# Patient Record
Sex: FEMALE | Race: WHITE | NOT HISPANIC OR LATINO | Employment: UNEMPLOYED | URBAN - METROPOLITAN AREA
[De-identification: names, ages, dates, MRNs, and addresses within clinical notes are randomized per-mention and may not be internally consistent; named-entity substitution may affect disease eponyms.]

---

## 2023-06-30 ENCOUNTER — HOSPITAL ENCOUNTER (OUTPATIENT)
Dept: DATA CONVERSION | Facility: HOSPITAL | Age: 58
End: 2023-06-30
Attending: ORTHOPAEDIC SURGERY | Admitting: ORTHOPAEDIC SURGERY

## 2023-06-30 DIAGNOSIS — Z79.891 LONG TERM (CURRENT) USE OF OPIATE ANALGESIC: ICD-10-CM

## 2023-06-30 DIAGNOSIS — F17.200 NICOTINE DEPENDENCE, UNSPECIFIED, UNCOMPLICATED: ICD-10-CM

## 2023-06-30 DIAGNOSIS — S53.125A POSTERIOR DISLOCATION OF LEFT ULNOHUMERAL JOINT, INITIAL ENCOUNTER: ICD-10-CM

## 2023-06-30 DIAGNOSIS — S52.532A COLLES' FRACTURE OF LEFT RADIUS, INITIAL ENCOUNTER FOR CLOSED FRACTURE: ICD-10-CM

## 2023-06-30 DIAGNOSIS — Z79.1 LONG TERM (CURRENT) USE OF NON-STEROIDAL ANTI-INFLAMMATORIES (NSAID): ICD-10-CM

## 2023-06-30 LAB
ANION GAP IN SER/PLAS: 14 MMOL/L (ref 10–20)
CALCIUM (MG/DL) IN SER/PLAS: 9.3 MG/DL (ref 8.6–10.3)
CARBON DIOXIDE, TOTAL (MMOL/L) IN SER/PLAS: 28 MMOL/L (ref 21–32)
CHLORIDE (MMOL/L) IN SER/PLAS: 94 MMOL/L (ref 98–107)
CREATININE (MG/DL) IN SER/PLAS: 0.61 MG/DL (ref 0.5–1.05)
GFR FEMALE: >90 ML/MIN/1.73M2
GLUCOSE (MG/DL) IN SER/PLAS: 386 MG/DL (ref 74–99)
POCT GLUCOSE: 304 MG/DL (ref 74–99)
POCT GLUCOSE: 373 MG/DL (ref 74–99)
POTASSIUM (MMOL/L) IN SER/PLAS: 5.1 MMOL/L (ref 3.5–5.3)
SODIUM (MMOL/L) IN SER/PLAS: 131 MMOL/L (ref 136–145)
UREA NITROGEN (MG/DL) IN SER/PLAS: 8 MG/DL (ref 6–23)

## 2023-09-30 NOTE — H&P
History & Physical Reviewed:   Pregnant/Lactating:  ·  Are You Pregnant no   ·  Are You Currently Breastfeeding no     I have reviewed the History and Physical dated:  29-Jun-2023   History and Physical reviewed and relevant findings noted. Patient examined to review pertinent physical  findings.: No significant changes   Home Medications Reviewed: no changes noted   Allergies Reviewed: no changes noted       ERAS (Enhanced Recovery After Surgery):  ·  ERAS Patient: no     Consent:   COVID-19 Consent:  ·  COVID-19 Risk Consent Surgeon has reviewed key risks related to the risk of rivera COVID-19 and if they contract COVID-19 what the risks are.       Electronic Signatures:  Neil Cleaning)  (Signed 30-Jun-2023 04:14)   Authored: History & Physical Reviewed, ERAS, Consent,  Note Completion      Last Updated: 30-Jun-2023 04:14 by Neil Cleaning)

## 2023-10-02 NOTE — OP NOTE
PROCEDURE DETAILS    Preoperative Diagnosis:  Closed traumatic displaced Colles' fracture of left radius, S52.532A  Posterior dislocation of ulnohumeral joint, left, initial encounter, S53.125A  null, null    Postoperative Diagnosis:  Closed traumatic displaced Colles' fracture of left radius, S52.532A  Posterior dislocation of ulnohumeral joint, left, initial encounter, S53.125A  null, null    Surgeon: Neil Cleaning  Resident/Fellow/Other Assistant: Rory Roman    Procedure:  1. OPEN REDUCTION INTERNAL FIXATION LEFT DISTAL RADIUS FRACTURE WITH C-ARM    2. LEFT ELBOW CLOSED REDUCTION     Anesthesia: No anesthesiologist associated with this case  Estimated Blood Loss: 10mL  Blood Replaced: none  Findings: intra-articular distal radius with more than 3 fragments  Specimens(s) Collected: no,     Complications: none  Implants: Synthes distal radius plate  Tourniquet Times: 33 minutes at 250mmHg on left arm  Patient Returned To/Condition: stable    Date of Dictation: 30-Jun-2023  Dictated By: Neil Cleaning MD  Dictation Job Number: [id number]                            Attestation:   Note Completion:  Attending Attestation I performed the procedure without a resident         Electronic Signatures:  Neil Cleaning)  (Signed 30-Jun-2023 08:49)   Authored: Post-Operative Note, Chart Review, Note Completion      Last Updated: 30-Jun-2023 08:49 by Neil Cleaning)

## 2023-11-03 ENCOUNTER — HOSPITAL ENCOUNTER (EMERGENCY)
Facility: HOSPITAL | Age: 58
Discharge: HOME | End: 2023-11-03

## 2023-11-03 VITALS
DIASTOLIC BLOOD PRESSURE: 66 MMHG | WEIGHT: 165 LBS | HEIGHT: 65 IN | HEART RATE: 86 BPM | OXYGEN SATURATION: 96 % | SYSTOLIC BLOOD PRESSURE: 128 MMHG | RESPIRATION RATE: 19 BRPM | TEMPERATURE: 98.2 F | BODY MASS INDEX: 27.49 KG/M2

## 2023-11-03 DIAGNOSIS — F10.920 ALCOHOLIC INTOXICATION WITHOUT COMPLICATION (CMS-HCC): Primary | ICD-10-CM

## 2023-11-03 DIAGNOSIS — F43.21 ADJUSTMENT DISORDER WITH DEPRESSED MOOD: ICD-10-CM

## 2023-11-03 PROBLEM — M25.632 STIFFNESS OF LEFT WRIST JOINT: Status: ACTIVE | Noted: 2023-08-07

## 2023-11-03 PROBLEM — I10 PRIMARY HYPERTENSION: Status: ACTIVE | Noted: 2023-07-13

## 2023-11-03 PROBLEM — E66.9 OBESITY, CLASS I, BMI 30-34.9: Status: ACTIVE | Noted: 2023-07-13

## 2023-11-03 PROBLEM — S52.509S: Status: ACTIVE | Noted: 2023-08-07

## 2023-11-03 PROBLEM — K21.9 GERD WITHOUT ESOPHAGITIS: Status: ACTIVE | Noted: 2020-11-25

## 2023-11-03 PROBLEM — M87.051 AVASCULAR NECROSIS OF BONE OF RIGHT HIP (MULTI): Status: ACTIVE | Noted: 2023-11-03

## 2023-11-03 PROBLEM — F17.200 TOBACCO USE DISORDER: Status: ACTIVE | Noted: 2023-07-13

## 2023-11-03 PROBLEM — E11.9 NEW ONSET TYPE 2 DIABETES MELLITUS (MULTI): Status: ACTIVE | Noted: 2023-07-13

## 2023-11-03 PROBLEM — M16.11 OSTEOARTHRITIS OF RIGHT HIP: Status: ACTIVE | Noted: 2023-11-03

## 2023-11-03 PROCEDURE — 99283 EMERGENCY DEPT VISIT LOW MDM: CPT

## 2023-11-03 SDOH — HEALTH STABILITY: MENTAL HEALTH

## 2023-11-03 SDOH — HEALTH STABILITY: MENTAL HEALTH: SUICIDE ASSESSMENT: ADULT (C-SSRS)

## 2023-11-03 SDOH — HEALTH STABILITY: MENTAL HEALTH: BEHAVIORS/MOOD: APPEARS INTOXICATED

## 2023-11-03 ASSESSMENT — PAIN SCALES - GENERAL
PAINLEVEL_OUTOF10: 0 - NO PAIN
PAINLEVEL_OUTOF10: 0 - NO PAIN

## 2023-11-03 ASSESSMENT — LIFESTYLE VARIABLES
EVER HAD A DRINK FIRST THING IN THE MORNING TO STEADY YOUR NERVES TO GET RID OF A HANGOVER: NO
REASON UNABLE TO ASSESS: NO
HAVE YOU EVER FELT YOU SHOULD CUT DOWN ON YOUR DRINKING: NO
HAVE PEOPLE ANNOYED YOU BY CRITICIZING YOUR DRINKING: NO
EVER FELT BAD OR GUILTY ABOUT YOUR DRINKING: NO

## 2023-11-03 ASSESSMENT — PAIN - FUNCTIONAL ASSESSMENT
PAIN_FUNCTIONAL_ASSESSMENT: 0-10
PAIN_FUNCTIONAL_ASSESSMENT: 0-10

## 2023-11-03 ASSESSMENT — PAIN DESCRIPTION - PROGRESSION: CLINICAL_PROGRESSION: NOT CHANGED

## 2023-11-03 NOTE — ED NOTES
Patient came to desk while I was giving report on another patient and would not leave the desk. I asked her several times to wait in her room and we would get her a provider as soon as possible. Patient began to get loud and angry with staff. Security was called back to assist patient back in to room, but once security was paged. Patient apologized and went back to her room.          Yu Jones RN  11/03/23 1953

## 2023-11-03 NOTE — ED TRIAGE NOTES
BIBA for roommate concern for suicidal ideations. Patient denies active or passive suicidal ideations, she does state that she drank today because she's been stressed about losing her job the end of last month. She states she just bought a house, trying to find a job in the holidays is difficult, and she was just having a bad day. States the last time she had even had an alcoholic beverage in almost 2 months.     Patient states she has no history of attempts either. Denies any other complaints at this time.

## 2023-11-04 NOTE — ED NOTES
Pt ambulated 2 laps of purple pod, with no assistance. Patient was steady on her feet, cooperative, and appropriate. Provider made aware.     Pt to be discharged into family/friend care.     Yu Jones RN  11/03/23 2038

## 2023-11-04 NOTE — ED PROVIDER NOTES
HPI   Chief Complaint   Patient presents with    Depression       Patient is a 38-year-old female who presents ED today for a psychiatric evaluation.  Patient has been drinking throughout the course of the day and recently lost her job and has been feeling depressed.  Patient allegedly made statements about thoughts of suicide and a friend called EMS.  EMS states the patient has denied any suicidal ideations, homicidal ideations. Patient currently denies any suicidal ideations or homicidal ideations.  Patient denies any audio or visual hallucinations.    Patient has history of: Anxiety, takes Klonopin as needed for this.  Patient states that there was a financial issue that seem to start this episode.  Patient endorses EtOH/tobacco, denies drug use.  Patient does not have a counselor/psychiatrist/psychologist that they see for their mental health care.                            [place] Coma Scale Score: 15                  Patient History   History reviewed. No pertinent past medical history.  Past Surgical History:   Procedure Laterality Date    OTHER SURGICAL HISTORY  10/15/2020    History of prior surgery     No family history on file.  Social History     Tobacco Use    Smoking status: Not on file    Smokeless tobacco: Not on file   Substance Use Topics    Alcohol use: Not on file    Drug use: Not on file       Physical Exam   ED Triage Vitals [11/03/23 1927]   Temp Heart Rate Resp BP   36.8 °C (98.2 °F) 95 19 119/85      SpO2 Temp Source Heart Rate Source Patient Position   95 % Temporal Monitor --      BP Location FiO2 (%)     Left arm --       Physical Exam  Vitals and nursing note reviewed.   Constitutional:       General: She is not in acute distress.     Appearance: She is well-developed.   HENT:      Head: Normocephalic and atraumatic.   Eyes:      Conjunctiva/sclera: Conjunctivae normal.   Cardiovascular:      Rate and Rhythm: Normal rate and regular rhythm.      Heart sounds: No murmur  heard.  Pulmonary:      Effort: Pulmonary effort is normal. No respiratory distress.      Breath sounds: Normal breath sounds.   Abdominal:      Palpations: Abdomen is soft.      Tenderness: There is no abdominal tenderness.   Musculoskeletal:         General: No swelling.      Cervical back: Neck supple.   Skin:     General: Skin is warm and dry.      Capillary Refill: Capillary refill takes less than 2 seconds.   Neurological:      Mental Status: She is alert and oriented to person, place, and time.      GCS: GCS eye subscore is 4. GCS verbal subscore is 5. GCS motor subscore is 6.      Gait: Gait abnormal (Walked patient to the bathroom as she was unsteady on her feet due to intoxication.).   Psychiatric:         Mood and Affect: Mood normal.         ED Course & MDM   ED Course as of 11/03/23 2155 Fri Nov 03, 2023 2030 Patient was ambulated throughout the department by nurse with no evidence of instability.  Patient was advised that she is able to be discharged if she can find a  to take her. [WS]   2050 Patient's ride is here and patient was able to ambulate out of the department without assistance. [WS]      ED Course User Index  [WS] Jl Lea, APRN-CNP         Diagnoses as of 11/03/23 2155   Alcoholic intoxication without complication (CMS/HCC)   Adjustment disorder with depressed mood       Medical Decision Making  Differential diagnosis: Alcohol  intoxication, depression, suicidal ideation.  Patient's vital signs are stable.  She was slightly unsteady on her feet, will allow patient to sober up and reassess her gait.  Once patient is clinically sober we will discharge her home with a safe .  Patient denies any suicidal or homicidal ideations.  Patient has no history of suicide attempts. Patient has denied any SI throughout her stay in the emergency department.  I do not believe she is a threat to herself and has good social supports at home.  She does not currently meet criteria for  inpatient hospitalization and we have no one here stating that she has made suicidal comments.    I discussed the differential, results and discharge plan with the patient.  I emphasized the importance of follow-up with the physician I referred them to in the timeframe recommended.  I explained reasons for the them to return to the Emergency Department. Additional verbal discharge instructions were also given and discussed with them to supplement those generated by the EMR. We also discussed medications that were prescribed (if any) including common side effects and interactions. All questions were addressed.  They understand return precautions and discharge instructions. They expressed understanding.                Procedure  Procedures                   Jl Lea, ELIZABETH-CNP  11/03/23 9231

## 2024-05-06 NOTE — OP NOTE
SURGEON:  Neil Cleaning MD    PREOPERATIVE DIAGNOSES:    1. Intra-articular left distal radius fracture with more than 3  fracture fragments.   2. Left elbow posterior dislocation.    POSTOPERATIVE DIAGNOSES:    1. Left distal radius intra-articular fracture with more than 3  fracture fragments.   2. Left elbow posterior dislocation.    PROCEDURES:    1. Left distal radius open reduction and internal fixation.  2. Left elbow closed reduction and splinting.    ASSISTANT:    Rory Roman SA.    ANESTHESIA:    General with endotracheal intubation and left axillary nerve block.    COMPLICATIONS:    None.    ESTIMATED BLOOD LOSS:    10 mL.    TOURNIQUET TIME:    33 minutes at 250 mmHg on left arm.    INDICATIONS:    The patient is a 58-year-old right-hand dominant female, who had  fallen down steps and sustained an intra-articular left distal radius  fracture as well as a posterior left elbow dislocation.  She was seen  in the Emergency Department.  She had undergone a splinting of the  distal radius fracture.  With regard to the left elbow and attempted  closed reduction and splinting revealed the patient was still  subluxated.  I explained to the patient given the intra-articular  nature of her fracture as well as the subluxation of her left elbow  joint that she would require surgery.  I explained to her in detail  the risks, benefits, and alternatives of a left distal radius open  reduction and internal fixation as well as a left elbow closed  reduction and splinting.  I had explained to the patient the risks,  benefits, and alternatives of this procedure.  I explained to the  patient the risks of procedure include, but are not limited to  infection, iatrogenic fracture; damage to nerves, tendons, and blood  vessels; malunion, nonunion, hardware failure; postoperative pain and  stiffness, redislocation of the left elbow as well as risks  associated with anesthesia.  The patient voiced understanding  and  informed consent was obtained.     DESCRIPTION OF PROCEDURE:    The patient was properly identified in the preoperative waiting area  and her left elbow and wrist were marked as site of surgery.  The  patient received Ancef intravenously.  The patient also received a  left axillary nerve block in preop.  The patient was then taken back  to the operating room suite and placed supine on the OR table.  A  nonsterile tourniquet was applied to the patient's left upper  extremity.  After general anesthesia with endotracheal intubation was  administered.  The patient's left upper extremity was prepped and  draped in sterile fashion.  A preoperative verification time-out was  taken.  The patient's left upper extremity was then exsanguinated  with Esmarch bandage and the tourniquet inflated to 250 mmHg.  I  began with the distal radius fracture.  I made approximately a 4 inch  longitudinal incision overlying the FCR tendon volarly.  Sharp  dissection was carried through skin and subcutaneous tissue.  Electrocautery was used to achieve hemostasis.  I opened up the roof  of the FCR tunnel, retracted the FCR ulnarly.  I opened up the floor  of the FCR tunnel, retracted the FPL ulnarly.  At this point, I used  electrocautery to elevate the pronator quadratus off the distal  radius.  There was significant comminution and laceration of the  pronator quadratus.  The fracture site was identified.  There was  comminution near the articular surface.  There was also a  longitudinal fracture line that was visible.  At this time, I  initially held longitudinal fracture line reduced and fixated that  with 2 transverse 2.7 mm screws placed in standard AO technique for  lagging.  At this point, I used longitudinal traction and a volarly  directed force to obtain my reduction of the intra-articular portion  of the fracture.  I provisionally held this in place with a Synthes 4  hole distal radius plate, held in place with K-wires.  A  C-arm  fluoroscopy in orthogonal views to confirm fracture reduction and  plate placement.  I was satisfied with this.  I was also satisfied  with the placement of the 2 transverse screws.  At this point, I  secured my fracture fixation by placing four 2.7 mm cortical screws  through the shaft holes of the plate.  Distally, I placed 6 locking  smooth buttress pegs.  K-wires were removed.  I used C-arm  fluoroscopy in orthogonal views to confirm fracture reduction and  hardware placement.  I was satisfied with this.  Wound was irrigated  with normal saline.  I attempted to close as much of the pronator  quadratus as I could with 3-0 Vicryl over the plate.  The tourniquet  was let down after 33 minutes.  Good vascular return was seen to the  patient's left upper extremity and all digits.  At this point, I then  closed the subcutaneous tissues with 3-0 Vicryl.  Skin was closed  with a running 4-0 Vicryl suture.  Steri-Strips were applied.  10 mL  of 0.5% plain Marcaine was injected also as a local anesthetic at  this point.  On the wrist, a sterile dressing consisting of Xeroform  gauze, 4 x 4's, Webril was applied.  At this point, with all the  sterile drapes taken down, a closed reduction of the elbow was  performed with longitudinal traction placed in the elbow and about  100 degrees of flexion.  C-arm confirmed elbow relocation at this  point.  I placed a volar wrist splint over the wrist.  The arm was  also placed in a long-arm posterior mold splint in this position.  C-arm fluoroscopy confirmed maintenance of elbow reduction.  The  patient was awakened from anesthesia and returned to recovery room in  stable condition.  There were no complications during the case.  All  sponge and needle counts were correct at the end of the case.       Neil Cleaning MD    DD:  06/30/2023 08:47:45 EST  DT:  06/30/2023 10:40:14 EST  DICTATION NUMBER:  [id number]  INTERNAL JOB NUMBER:  [id number]             Electronic  Signatures:  Neil Cleaning) (Signed on 30-Jun-2023 11:14)   Authored  Unsigned, Draft (SYS GENERATED) (Entered on 30-Jun-2023 10:40)   Entered    Last Updated: 30-Jun-2023 11:14 by Neil Cleaning)

## 2024-05-21 ENCOUNTER — APPOINTMENT (OUTPATIENT)
Dept: CARDIOLOGY | Facility: HOSPITAL | Age: 59
End: 2024-05-21

## 2024-05-21 ENCOUNTER — APPOINTMENT (OUTPATIENT)
Dept: RADIOLOGY | Facility: HOSPITAL | Age: 59
End: 2024-05-21

## 2024-05-21 ENCOUNTER — HOSPITAL ENCOUNTER (EMERGENCY)
Facility: HOSPITAL | Age: 59
Discharge: HOME | End: 2024-05-21
Attending: EMERGENCY MEDICINE

## 2024-05-21 VITALS
RESPIRATION RATE: 20 BRPM | SYSTOLIC BLOOD PRESSURE: 102 MMHG | OXYGEN SATURATION: 98 % | HEART RATE: 82 BPM | TEMPERATURE: 97 F | DIASTOLIC BLOOD PRESSURE: 50 MMHG

## 2024-05-21 DIAGNOSIS — R53.1 GENERALIZED WEAKNESS: ICD-10-CM

## 2024-05-21 DIAGNOSIS — F10.10 ALCOHOL ABUSE: ICD-10-CM

## 2024-05-21 DIAGNOSIS — N17.9 ACUTE KIDNEY INJURY (CMS-HCC): Primary | ICD-10-CM

## 2024-05-21 LAB
ALBUMIN SERPL BCP-MCNC: 3.8 G/DL (ref 3.4–5)
ALP SERPL-CCNC: 108 U/L (ref 33–136)
ALT SERPL W P-5'-P-CCNC: 12 U/L (ref 7–45)
ANION GAP SERPL CALC-SCNC: 20 MMOL/L (ref 10–20)
AST SERPL W P-5'-P-CCNC: 25 U/L (ref 9–39)
BASOPHILS # BLD AUTO: 0.1 X10*3/UL (ref 0–0.1)
BASOPHILS NFR BLD AUTO: 0.9 %
BILIRUB SERPL-MCNC: 0.6 MG/DL (ref 0–1.2)
BUN SERPL-MCNC: 14 MG/DL (ref 6–23)
CALCIUM SERPL-MCNC: 9.3 MG/DL (ref 8.6–10.3)
CARDIAC TROPONIN I PNL SERPL HS: 4 NG/L (ref 0–13)
CHLORIDE SERPL-SCNC: 91 MMOL/L (ref 98–107)
CO2 SERPL-SCNC: 23 MMOL/L (ref 21–32)
CREAT SERPL-MCNC: 1.3 MG/DL (ref 0.5–1.05)
EGFRCR SERPLBLD CKD-EPI 2021: 47 ML/MIN/1.73M*2
EOSINOPHIL # BLD AUTO: 0.02 X10*3/UL (ref 0–0.7)
EOSINOPHIL NFR BLD AUTO: 0.2 %
ERYTHROCYTE [DISTWIDTH] IN BLOOD BY AUTOMATED COUNT: 13.2 % (ref 11.5–14.5)
ETHANOL SERPL-MCNC: 299 MG/DL
GLUCOSE BLD MANUAL STRIP-MCNC: 137 MG/DL (ref 74–99)
GLUCOSE BLD MANUAL STRIP-MCNC: 80 MG/DL (ref 74–99)
GLUCOSE SERPL-MCNC: 71 MG/DL (ref 74–99)
HCT VFR BLD AUTO: 37.5 % (ref 36–46)
HGB BLD-MCNC: 13.5 G/DL (ref 12–16)
IMM GRANULOCYTES # BLD AUTO: 0.02 X10*3/UL (ref 0–0.7)
IMM GRANULOCYTES NFR BLD AUTO: 0.2 % (ref 0–0.9)
INR PPP: 0.9 (ref 0.9–1.1)
LYMPHOCYTES # BLD AUTO: 3.47 X10*3/UL (ref 1.2–4.8)
LYMPHOCYTES NFR BLD AUTO: 32.9 %
MAGNESIUM SERPL-MCNC: 1.95 MG/DL (ref 1.6–2.4)
MCH RBC QN AUTO: 36.9 PG (ref 26–34)
MCHC RBC AUTO-ENTMCNC: 36 G/DL (ref 32–36)
MCV RBC AUTO: 103 FL (ref 80–100)
MONOCYTES # BLD AUTO: 0.67 X10*3/UL (ref 0.1–1)
MONOCYTES NFR BLD AUTO: 6.4 %
NEUTROPHILS # BLD AUTO: 6.26 X10*3/UL (ref 1.2–7.7)
NEUTROPHILS NFR BLD AUTO: 59.4 %
NRBC BLD-RTO: 0 /100 WBCS (ref 0–0)
PLATELET # BLD AUTO: 258 X10*3/UL (ref 150–450)
POTASSIUM SERPL-SCNC: 3.8 MMOL/L (ref 3.5–5.3)
PROT SERPL-MCNC: 6.8 G/DL (ref 6.4–8.2)
PROTHROMBIN TIME: 10.6 SECONDS (ref 9.8–12.8)
RBC # BLD AUTO: 3.66 X10*6/UL (ref 4–5.2)
SODIUM SERPL-SCNC: 130 MMOL/L (ref 136–145)
WBC # BLD AUTO: 10.5 X10*3/UL (ref 4.4–11.3)

## 2024-05-21 PROCEDURE — 82565 ASSAY OF CREATININE: CPT | Performed by: PHYSICIAN ASSISTANT

## 2024-05-21 PROCEDURE — 2500000001 HC RX 250 WO HCPCS SELF ADMINISTERED DRUGS (ALT 637 FOR MEDICARE OP): Performed by: PHYSICIAN ASSISTANT

## 2024-05-21 PROCEDURE — 71045 X-RAY EXAM CHEST 1 VIEW: CPT | Performed by: RADIOLOGY

## 2024-05-21 PROCEDURE — 96361 HYDRATE IV INFUSION ADD-ON: CPT

## 2024-05-21 PROCEDURE — 96365 THER/PROPH/DIAG IV INF INIT: CPT

## 2024-05-21 PROCEDURE — 96375 TX/PRO/DX INJ NEW DRUG ADDON: CPT

## 2024-05-21 PROCEDURE — 70450 CT HEAD/BRAIN W/O DYE: CPT

## 2024-05-21 PROCEDURE — 85610 PROTHROMBIN TIME: CPT | Performed by: PHYSICIAN ASSISTANT

## 2024-05-21 PROCEDURE — 85025 COMPLETE CBC W/AUTO DIFF WBC: CPT | Performed by: PHYSICIAN ASSISTANT

## 2024-05-21 PROCEDURE — 82077 ASSAY SPEC XCP UR&BREATH IA: CPT | Performed by: PHYSICIAN ASSISTANT

## 2024-05-21 PROCEDURE — 84484 ASSAY OF TROPONIN QUANT: CPT | Performed by: PHYSICIAN ASSISTANT

## 2024-05-21 PROCEDURE — 2500000005 HC RX 250 GENERAL PHARMACY W/O HCPCS: Performed by: PHYSICIAN ASSISTANT

## 2024-05-21 PROCEDURE — 93005 ELECTROCARDIOGRAM TRACING: CPT

## 2024-05-21 PROCEDURE — 2500000005 HC RX 250 GENERAL PHARMACY W/O HCPCS: Performed by: EMERGENCY MEDICINE

## 2024-05-21 PROCEDURE — 70450 CT HEAD/BRAIN W/O DYE: CPT | Performed by: RADIOLOGY

## 2024-05-21 PROCEDURE — 72125 CT NECK SPINE W/O DYE: CPT

## 2024-05-21 PROCEDURE — 82947 ASSAY GLUCOSE BLOOD QUANT: CPT

## 2024-05-21 PROCEDURE — 2500000004 HC RX 250 GENERAL PHARMACY W/ HCPCS (ALT 636 FOR OP/ED): Performed by: PHYSICIAN ASSISTANT

## 2024-05-21 PROCEDURE — 36415 COLL VENOUS BLD VENIPUNCTURE: CPT | Performed by: PHYSICIAN ASSISTANT

## 2024-05-21 PROCEDURE — 72125 CT NECK SPINE W/O DYE: CPT | Performed by: RADIOLOGY

## 2024-05-21 PROCEDURE — 71045 X-RAY EXAM CHEST 1 VIEW: CPT

## 2024-05-21 PROCEDURE — 99285 EMERGENCY DEPT VISIT HI MDM: CPT | Mod: 25

## 2024-05-21 PROCEDURE — 83735 ASSAY OF MAGNESIUM: CPT | Performed by: PHYSICIAN ASSISTANT

## 2024-05-21 PROCEDURE — 96366 THER/PROPH/DIAG IV INF ADDON: CPT

## 2024-05-21 RX ORDER — DEXTROSE 50 % IN WATER (D50W) INTRAVENOUS SYRINGE
25 ONCE
Status: COMPLETED | OUTPATIENT
Start: 2024-05-21 | End: 2024-05-21

## 2024-05-21 RX ORDER — THIAMINE HYDROCHLORIDE 100 MG/ML
100 INJECTION, SOLUTION INTRAMUSCULAR; INTRAVENOUS ONCE
Status: COMPLETED | OUTPATIENT
Start: 2024-05-21 | End: 2024-05-21

## 2024-05-21 RX ORDER — FOLIC ACID 1 MG/1
1 TABLET ORAL ONCE
Status: COMPLETED | OUTPATIENT
Start: 2024-05-21 | End: 2024-05-21

## 2024-05-21 RX ORDER — PROMETHAZINE HYDROCHLORIDE 12.5 MG/1
12.5 TABLET ORAL EVERY 8 HOURS PRN
COMMUNITY
Start: 2024-05-20

## 2024-05-21 RX ADMIN — FOLIC ACID 1 MG: 1 TABLET ORAL at 18:37

## 2024-05-21 RX ADMIN — DEXTROSE MONOHYDRATE 25 G: 25 INJECTION, SOLUTION INTRAVENOUS at 18:38

## 2024-05-21 RX ADMIN — SODIUM CHLORIDE 1000 ML: 9 INJECTION, SOLUTION INTRAVENOUS at 17:25

## 2024-05-21 RX ADMIN — ASCORBIC ACID, VITAMIN A PALMITATE, CHOLECALCIFEROL, THIAMINE HYDROCHLORIDE, RIBOFLAVIN-5 PHOSPHATE SODIUM, PYRIDOXINE HYDROCHLORIDE, NIACINAMIDE, DEXPANTHENOL, ALPHA-TOCOPHEROL ACETATE, VITAMIN K1, FOLIC ACID, BIOTIN, CYANOCOBALAMIN: 200; 3300; 200; 6; 3.6; 6; 40; 15; 10; 150; 600; 60; 5 INJECTION, SOLUTION INTRAVENOUS at 20:00

## 2024-05-21 RX ADMIN — THIAMINE HYDROCHLORIDE 100 MG: 100 INJECTION, SOLUTION INTRAMUSCULAR; INTRAVENOUS at 18:37

## 2024-05-21 ASSESSMENT — LIFESTYLE VARIABLES
EVER HAD A DRINK FIRST THING IN THE MORNING TO STEADY YOUR NERVES TO GET RID OF A HANGOVER: YES
TOTAL SCORE: 4
EVER FELT BAD OR GUILTY ABOUT YOUR DRINKING: YES
HAVE PEOPLE ANNOYED YOU BY CRITICIZING YOUR DRINKING: YES
HAVE YOU EVER FELT YOU SHOULD CUT DOWN ON YOUR DRINKING: YES

## 2024-05-21 ASSESSMENT — COLUMBIA-SUICIDE SEVERITY RATING SCALE - C-SSRS
1. IN THE PAST MONTH, HAVE YOU WISHED YOU WERE DEAD OR WISHED YOU COULD GO TO SLEEP AND NOT WAKE UP?: NO
6. HAVE YOU EVER DONE ANYTHING, STARTED TO DO ANYTHING, OR PREPARED TO DO ANYTHING TO END YOUR LIFE?: NO
2. HAVE YOU ACTUALLY HAD ANY THOUGHTS OF KILLING YOURSELF?: NO

## 2024-05-21 NOTE — ED PROVIDER NOTES
HPI   Chief Complaint   Patient presents with    Weakness, Gen       59-year-old female with a significant past medical history of diabetes mellitus as well as alcohol abuse presents complaining of generalized weakness.  The patient reportedly called 911 after returning home from work today.  She states that she could not complete her shift because she was lightheaded and weak.  She reports that she has not been eating states that she has had a hard time with her diet since becoming a diabetic.  Patient also admitted to consuming alcohol throughout the day today.  She denies any recent trauma or fall.  No reports of vomiting or diarrhea.  Denies any chest pain or shortness of breath.  Denies cough.  Denies abdominal discomfort.  No reports of UTI-like symptoms.                          [place] Coma Scale Score: 15                     Patient History   Past Medical History:   Diagnosis Date    Diabetes mellitus (Multi)      Past Surgical History:   Procedure Laterality Date    OTHER SURGICAL HISTORY  10/15/2020    History of prior surgery     No family history on file.  Social History     Tobacco Use    Smoking status: Every Day     Current packs/day: 0.50     Types: Cigarettes    Smokeless tobacco: Never   Substance Use Topics    Alcohol use: Yes     Comment: daily    Drug use: Not Currently       Physical Exam   ED Triage Vitals [05/21/24 1700]   Temperature Heart Rate Respirations BP   36.1 °C (97 °F) 98 18 85/61      Pulse Ox Temp Source Heart Rate Source Patient Position   98 % Tympanic Monitor Sitting      BP Location FiO2 (%)     Left arm --       Physical Exam  Vitals and nursing note reviewed.   Constitutional:       General: She is not in acute distress.     Appearance: Normal appearance. She is normal weight. She is not ill-appearing, toxic-appearing or diaphoretic.   HENT:      Head: Normocephalic and atraumatic.      Nose: Nose normal.      Mouth/Throat:      Mouth: Mucous membranes are moist.   Eyes:       Extraocular Movements: Extraocular movements intact.      Conjunctiva/sclera: Conjunctivae normal.   Cardiovascular:      Rate and Rhythm: Normal rate and regular rhythm.      Pulses: Normal pulses.   Pulmonary:      Effort: Pulmonary effort is normal. No respiratory distress.      Breath sounds: Normal breath sounds.   Abdominal:      General: Abdomen is flat. There is no distension.      Palpations: Abdomen is soft.      Tenderness: There is no abdominal tenderness. There is no guarding or rebound.   Musculoskeletal:         General: Normal range of motion.      Cervical back: Normal range of motion and neck supple.      Comments: Moving all extremities without difficulty.  There is no direct bony tenderness appreciated on exam.  Compartments remain soft.  Patient is neurovascular intact throughout.   Skin:     General: Skin is warm and dry.      Capillary Refill: Capillary refill takes less than 2 seconds.   Neurological:      General: No focal deficit present.      Mental Status: She is alert and oriented to person, place, and time.   Psychiatric:         Judgment: Judgment normal.         ED Course & MDM   ED Course as of 24   Tue May 21, 2024   1745 EKG obtained interpreted by me.  Sinus rhythm.  T wave inversions V1 through V3.  Rate of 91.  No acute ischemia.  No STEMI. [MK]   1752 LEUKOCYTES (10*3/UL) IN BLOOD BY AUTOMATED COUNT, [place]: 10.5 [DS]   1753 HEMOGLOBIN: 13.5 [DS]   1819 MAGNESIUM: 1.95 [DS]   1820 Troponin I, High Sensitivity: 4 [DS]   1820 Alcohol(!): 299 [DS]   1820 ALT: 12 [DS]   1820 AST: 25 [DS]   1820 Bilirubin, Total : 0.6 [DS]   1821 POTASSIUM: 3.8 [DS]   1821 Anion Gap: 20 [DS]   1821 Bicarbonate: 23 [DS]   1847 IMPRESSION:  1.  No evidence of acute cardiopulmonary process.   [DS]   2016 IMPRESSION:  1. No evidence of acute intracranial abnormality. Mild volume loss  and microvascular ischemic disease.  2. No evidence of acute cervical spine fracture. Multilevel  moderate  cervical spondylosis.   [DS]   2016 IMPRESSION:  1.  No evidence of acute cardiopulmonary process.   [DS]      ED Course User Index  [DS] Vinh Robles PA-C  [MK] Jace Pinto MD         Diagnoses as of 05/21/24 2158   Acute kidney injury (CMS-[facility])   Alcohol abuse   Generalized weakness       Medical Decision Making  Patient was seen and evaluated for generalized weakness and lightheadedness.  Patient found to be hypotensive 85/61 during the initial triage assessment.  She is afebrile.  The patient reportedly has not been eating properly since becoming a diabetic.  She also does not check her glucose and rarely takes her medication.  Additionally, the patient is an alcoholic. Blood work revealed no evidence of leukocytosis or concerning anemia.  No evidence of significant electrolyte derangement.  No evidence of transaminitis.  Anion gap slightly elevated at 20 with a bicarbonate of 23.  Alcohol 299.  Troponin negative.  Patient was given IV hydration along with folic acid and thiamine.  Patient was found to have acute kidney injury.  She was aggressively hydrated.  CT imaging of the head and cervical spine was ordered and found to be negative.  Chest imaging revealed no evidence of acute cardiopulmonary process.  Spoke with the patient's partner regarding the history present illness.  At this point there is no indication for admission.  I did offer help through [facility] for alcohol abuse however she declined.  Patient will be discharged home with a sober ride.  Recommend returning if she would like assistance for her drug use        Procedure  Procedures     Vinh Robles PA-C  05/21/24 2202

## 2024-05-21 NOTE — ED TRIAGE NOTES
Pt presents to ED for report of generalized weakness. Per EMS, pt was dx as a type 1 diabetic and has been insulin dependent. Since then pt has not been eating or drinking much. Pt has been drinking wine daily. Pt states she drank a half a bottle prior to coming to the ED. Pt unable to ambulate due to extreme weakness.

## 2024-05-29 LAB
ATRIAL RATE: 91 BPM
P AXIS: 73 DEGREES
PR INTERVAL: 177 MS
Q ONSET: 249 MS
QRS COUNT: 15 BEATS
QRS DURATION: 92 MS
QT INTERVAL: 365 MS
QTC CALCULATION(BAZETT): 450 MS
QTC FREDERICIA: 419 MS
R AXIS: 79 DEGREES
T AXIS: 30 DEGREES
T OFFSET: 432 MS
VENTRICULAR RATE: 91 BPM

## 2024-09-21 ENCOUNTER — HOSPITAL ENCOUNTER (EMERGENCY)
Facility: HOSPITAL | Age: 59
Discharge: HOME | End: 2024-09-21
Attending: EMERGENCY MEDICINE

## 2024-09-21 VITALS
BODY MASS INDEX: 20.09 KG/M2 | TEMPERATURE: 98.8 F | SYSTOLIC BLOOD PRESSURE: 103 MMHG | DIASTOLIC BLOOD PRESSURE: 69 MMHG | RESPIRATION RATE: 18 BRPM | WEIGHT: 125 LBS | OXYGEN SATURATION: 100 % | HEIGHT: 66 IN | HEART RATE: 92 BPM

## 2024-09-21 DIAGNOSIS — F10.920 ALCOHOLIC INTOXICATION WITHOUT COMPLICATION (CMS-HCC): ICD-10-CM

## 2024-09-21 DIAGNOSIS — E16.2 HYPOGLYCEMIA: Primary | ICD-10-CM

## 2024-09-21 LAB
GLUCOSE BLD MANUAL STRIP-MCNC: 160 MG/DL (ref 74–99)
GLUCOSE BLD MANUAL STRIP-MCNC: 47 MG/DL (ref 74–99)
GLUCOSE BLD MANUAL STRIP-MCNC: 58 MG/DL (ref 74–99)
GLUCOSE BLD MANUAL STRIP-MCNC: 69 MG/DL (ref 74–99)
GLUCOSE BLD MANUAL STRIP-MCNC: 83 MG/DL (ref 74–99)

## 2024-09-21 PROCEDURE — 82947 ASSAY GLUCOSE BLOOD QUANT: CPT

## 2024-09-21 PROCEDURE — 99283 EMERGENCY DEPT VISIT LOW MDM: CPT

## 2024-09-21 RX ORDER — DEXTROSE 50 % IN WATER (D50W) INTRAVENOUS SYRINGE
25 ONCE
Status: DISCONTINUED | OUTPATIENT
Start: 2024-09-21 | End: 2024-09-21 | Stop reason: HOSPADM

## 2024-09-21 SDOH — HEALTH STABILITY: MENTAL HEALTH: BEHAVIORS/MOOD: APPEARS INTOXICATED

## 2024-09-21 SDOH — SOCIAL STABILITY: SOCIAL NETWORK: PARENT/GUARDIAN/SIGNIFICANT OTHER INVOLVEMENT: NO INVOLVEMENT

## 2024-09-21 SDOH — HEALTH STABILITY: MENTAL HEALTH: BEHAVIORAL HEALTH(WDL): EXCEPTIONS TO WDL

## 2024-09-21 SDOH — HEALTH STABILITY: MENTAL HEALTH: HAVE YOU WISHED YOU WERE DEAD OR WISHED YOU COULD GO TO SLEEP AND NOT WAKE UP?: NO

## 2024-09-21 SDOH — SOCIAL STABILITY: SOCIAL NETWORK: EMOTIONAL SUPPORT GIVEN: PATIENT COUNSELING

## 2024-09-21 SDOH — HEALTH STABILITY: MENTAL HEALTH: HAVE YOU ACTUALLY HAD ANY THOUGHTS OF KILLING YOURSELF?: NO

## 2024-09-21 SDOH — HEALTH STABILITY: MENTAL HEALTH: SUICIDE ASSESSMENT: ADULT (C-SSRS)

## 2024-09-21 ASSESSMENT — PAIN SCALES - GENERAL
PAINLEVEL_OUTOF10: 0 - NO PAIN

## 2024-09-21 ASSESSMENT — LIFESTYLE VARIABLES
TOTAL SCORE: 1
EVER FELT BAD OR GUILTY ABOUT YOUR DRINKING: NO
HAVE PEOPLE ANNOYED YOU BY CRITICIZING YOUR DRINKING: YES
HAVE YOU EVER FELT YOU SHOULD CUT DOWN ON YOUR DRINKING: NO
EVER HAD A DRINK FIRST THING IN THE MORNING TO STEADY YOUR NERVES TO GET RID OF A HANGOVER: NO

## 2024-09-21 ASSESSMENT — COLUMBIA-SUICIDE SEVERITY RATING SCALE - C-SSRS
6. HAVE YOU EVER DONE ANYTHING, STARTED TO DO ANYTHING, OR PREPARED TO DO ANYTHING TO END YOUR LIFE?: NO
2. HAVE YOU ACTUALLY HAD ANY THOUGHTS OF KILLING YOURSELF?: NO
1. IN THE PAST MONTH, HAVE YOU WISHED YOU WERE DEAD OR WISHED YOU COULD GO TO SLEEP AND NOT WAKE UP?: NO

## 2024-09-21 ASSESSMENT — PAIN - FUNCTIONAL ASSESSMENT
PAIN_FUNCTIONAL_ASSESSMENT: 0-10
PAIN_FUNCTIONAL_ASSESSMENT: 0-10

## 2024-09-21 NOTE — ED PROVIDER NOTES
Limitations to History: Alcohol intoxication  Independent Historians: EMS    HPI  Whitley Castillo is a 59 y.o. female with a history of diabetes not on medications presenting to the emergency department with concern for alcohol intoxication and suicidal ideation.  She reports drinking today.  She denies making any suicidal statements but her roommate called EMS because she was stating that she would have to kill herself.  She currently denies SI or HI.  She has no other complaints except for being intoxicated.    PMH  Past Medical History:   Diagnosis Date    Diabetes mellitus (Multi)        Meds  Current Outpatient Medications   Medication Instructions    dulaglutide 3 mg, subcutaneous, Weekly    promethazine (PHENERGAN) 12.5 mg, oral, Every 8 hours PRN       Allergies  Allergies   Allergen Reactions    Metformin Nausea/vomiting        SHx  Social History     Tobacco Use    Smoking status: Every Day     Current packs/day: 0.50     Types: Cigarettes    Smokeless tobacco: Never   Substance Use Topics    Alcohol use: Yes     Comment: daily    Drug use: Not Currently       ------------------------------------------------------------------------------------------------------------------------------------------    There were no vitals taken for this visit.    Physical Exam  Vitals and nursing note reviewed.   Constitutional:       General: She is not in acute distress.     Appearance: Normal appearance.   HENT:      Head: Normocephalic and atraumatic.      Right Ear: External ear normal.      Left Ear: External ear normal.   Eyes:      Extraocular Movements: Extraocular movements intact.      Conjunctiva/sclera: Conjunctivae normal.   Cardiovascular:      Rate and Rhythm: Normal rate and regular rhythm.      Pulses: Normal pulses.      Heart sounds: Normal heart sounds. No murmur heard.     No friction rub. No gallop.   Pulmonary:      Effort: Pulmonary effort is normal. No respiratory distress.      Breath sounds: No  wheezing, rhonchi or rales.   Abdominal:      General: There is no distension.      Palpations: Abdomen is soft.      Tenderness: There is no abdominal tenderness. There is no guarding or rebound.   Musculoskeletal:         General: No swelling. Normal range of motion.      Cervical back: Neck supple.      Right lower leg: No edema.      Left lower leg: No edema.   Skin:     General: Skin is warm and dry.   Neurological:      General: No focal deficit present.      Mental Status: She is alert and oriented to person, place, and time.   Psychiatric:         Mood and Affect: Mood normal.         Speech: Speech is slurred.         Behavior: Behavior normal.         Thought Content: Thought content does not include homicidal or suicidal ideation. Thought content does not include homicidal or suicidal plan.          ------------------------------------------------------------------------------------------------------------------------------------------    Medical Decision Making: This is a 59-year-old female presenting to the emergency department with alcohol intoxication.  Vital signs are normal and stable.  On examination, she has slurred speech consistent with alcohol intoxication.  Throughout my assessment including while she was clinically intoxicated, she continued to deny any suicidal ideation.  She was observed to clinical sobriety and was able to ambulate without difficulty.  She did have a couple of episodes of hypoglycemia which is likely related to malnutrition.  She was given food in the emergency department and reported improvement of her blood sugars.  She stable for discharge at this time with return precautions provided.  At the time of disposition, I also assessed for suicidal ideation and she continued to deny this.  I do not believe that she requires psychiatric evaluation at this time.    Diagnoses as of 09/21/24 1922   Hypoglycemia   Alcoholic intoxication without complication (CMS-[facility])          Hermann Ca MD  Emergency Medicine Attending       Hermann Ca MD  09/21/24 1923

## 2024-09-21 NOTE — ED NOTES
Pt denies SI/HI at this time. Pt intoxicated will continue to reassess     Mahnaz Fernandez RN  09/21/24 [id number]